# Patient Record
Sex: MALE | Employment: FULL TIME | URBAN - METROPOLITAN AREA
[De-identification: names, ages, dates, MRNs, and addresses within clinical notes are randomized per-mention and may not be internally consistent; named-entity substitution may affect disease eponyms.]

---

## 2024-04-18 ENCOUNTER — APPOINTMENT (OUTPATIENT)
Dept: DERMATOLOGY | Facility: CLINIC | Age: 24
End: 2024-04-18
Payer: COMMERCIAL

## 2024-04-26 ENCOUNTER — OFFICE VISIT (OUTPATIENT)
Dept: DERMATOLOGY | Facility: CLINIC | Age: 24
End: 2024-04-26
Payer: COMMERCIAL

## 2024-04-26 DIAGNOSIS — L40.0 PSORIASIS VULGARIS: Primary | ICD-10-CM

## 2024-04-26 PROCEDURE — 99214 OFFICE O/P EST MOD 30 MIN: CPT | Performed by: DERMATOLOGY

## 2024-04-26 RX ORDER — HYDROCORTISONE 25 MG/G
OINTMENT TOPICAL 2 TIMES DAILY
Qty: 30 G | Refills: 3 | Status: SHIPPED | OUTPATIENT
Start: 2024-04-26 | End: 2025-04-26

## 2024-04-26 RX ORDER — TRIAMCINOLONE ACETONIDE 1 MG/G
OINTMENT TOPICAL
Qty: 30 G | Refills: 11 | Status: SHIPPED | OUTPATIENT
Start: 2024-04-26

## 2024-04-26 NOTE — PROGRESS NOTES
Subjective     Nelson Murdock is a 24 y.o. male who presents for the following: Rash (Redness around belly button - noticed it about 1 year ago. Tried topicals prescribed by PCP ).       Patient reported that this has been going on for a year. He tried topicals prescribed by his PCPs and some OTC which was mildly helpful. His PCP had performed a bacterial swab demonstrating staph growth. Denies any associated pruritus.     Of note, he has a history of psoriasis.     Review of Systems:  No other skin or systemic complaints other than what is documented elsewhere in the note.    The following portions of the chart were reviewed this encounter and updated as appropriate:          Skin Cancer History  No skin cancer on file.      Specialty Problems    None       Objective   Well appearing patient in no apparent distress; mood and affect are within normal limits.    A focused skin examination was performed. All findings within normal limits unless otherwise noted below.    Thick erythematous plaques with white, adherent, micaceous scale.   Physician Global Assessment: 2 - Mild.  Examinations of joints as well as fingers/toes reveals no signs or symptoms of psoriatic arthritis.  < 3% Body Surface Area involved.            Assessment/Plan   Psoriasis vulgaris    Chronic plaque psoriasis- new eruption involving the umbilicus   - Patient has a history of psoriasis on the scalp/face (favoring more sebopsoriasis) which he uses treatment prescribed by his PCP and is well controlled.  - Initially unsure if this was infected site but discussed with patient that this is consistent with Psoriasis and the umbilicus is a common affected area.   - Recommend starting triamcinolone 0.1% ointment to the affected twice a day. Risks of topical corticosteriods reviewed including skin thinning, easy bruising, discoloration with prolonged use.    - START hydrocortisone 2.5% ointment twice a day on the face. Use only as needed.   -RTC 3  month        Related Procedures  Follow Up In Dermatology - Established Patient    Related Medications  triamcinolone (Kenalog) 0.1 % ointment  Apply twice a day to the affected area until resolution.    hydrocortisone 2.5 % ointment  Apply topically 2 times a day.      My DO Emmie  Department of Dermatology    I saw and evaluated the patient. I personally obtained the key and critical portions of the history and physical exam or was physically present for key and critical portions performed by the resident/fellow. I reviewed the resident/fellow's documentation and discussed the patient with the resident/fellow. I agree with the resident/fellow's medical decision making as documented in the note.    Trevor Trent MD PhD

## 2024-08-09 ENCOUNTER — APPOINTMENT (OUTPATIENT)
Dept: DERMATOLOGY | Facility: CLINIC | Age: 24
End: 2024-08-09
Payer: COMMERCIAL

## 2024-11-08 ENCOUNTER — APPOINTMENT (OUTPATIENT)
Dept: DERMATOLOGY | Facility: CLINIC | Age: 24
End: 2024-11-08
Payer: COMMERCIAL

## 2024-11-08 DIAGNOSIS — L40.0 PSORIASIS VULGARIS: ICD-10-CM

## 2024-11-08 PROCEDURE — G2211 COMPLEX E/M VISIT ADD ON: HCPCS | Performed by: DERMATOLOGY

## 2024-11-08 PROCEDURE — 99213 OFFICE O/P EST LOW 20 MIN: CPT | Performed by: DERMATOLOGY

## 2024-11-08 PROCEDURE — 11900 INJECT SKIN LESIONS </W 7: CPT

## 2024-11-08 RX ORDER — DEXTROAMPHETAMINE SACCHARATE, AMPHETAMINE ASPARTATE, DEXTROAMPHETAMINE SULFATE AND AMPHETAMINE SULFATE 5; 5; 5; 5 MG/1; MG/1; MG/1; MG/1
20 TABLET ORAL
COMMUNITY
Start: 2024-11-12 | End: 2025-01-11

## 2024-11-08 ASSESSMENT — ITCH NUMERIC RATING SCALE: HOW SEVERE IS YOUR ITCHING?: 0

## 2024-11-08 NOTE — PROGRESS NOTES
Subjective     Nelson Murdock is a 24 y.o. male who presents for the following: Psoriasis (Triamcinolone somewhat helps but doesn't fully clear it up. Uses Hydrocortisone prn ). [facility] 4/25/2024.    Patient complains of psoriasis affecting his umbilicus, face, and scalp for several years. Face and scalp have responded relatively well to clobetasol 0.05% solution and hydrocortisone 2.5% ointment, but he continues to have some scaling. Additionally, plaque on the umbilicus has been persistent. At last visit, he was started on triamcinolone 0.1% ointment BID to the umbilicus. He reports that while it helps slightly, even if he uses it 2 times a day for 2 weeks in a row, occluding the area, the plaque still remains and worsens when he stops topicals. He has never been completely clear. Denies joint pain, swelling, erythema, or stiffness.    Review of Systems:  No other skin or systemic complaints other than what is documented elsewhere in the note.    The following portions of the chart were reviewed this encounter and updated as appropriate:          Skin Cancer History  No skin cancer on file.      Specialty Problems    None       Objective   Well appearing patient in no apparent distress; mood and affect are within normal limits.    A focused skin examination was performed. All findings within normal limits unless otherwise noted below.    Assessment/Plan   1. Psoriasis vulgaris  Thick erythematous plaques with white, adherent, micaceous scale involving the umbilicus and the scalp.  Physician Global Assessment: 2 - Mild.  Examinations of joints as well as fingers/toes reveals no signs or symptoms of psoriatic arthritis.  < 3% Body Surface Area involved.     Chronic plaque psoriasis, involving scalp/face umbilicus, uncontrolled  - No evidence of psoriatic arthritis  - Discussed management options including continuing current therapy and trialing ILK. Discussed the R/B/A of ILK including hypopigmentation and skin atrophy.  Patient would like to proceed with ILK today for persistent plaque to the umbilicus.  - CONTINUE triamcinolone 0.1% ointment to the affected twice a day. Risks of topical corticosteriods reviewed including skin thinning, easy bruising, discoloration with prolonged use.    - CONTINUE hydrocortisone 2.5% ointment twice a day on the face. Use only as needed.   - CONTINUE clobetasol 0.05% solution as needed to the scalp.  - RTC 2-3 months        Intralesional injection  Intralesional Injection:   Consent:     Consent obtained:  Verbal    Consent given by:  Patient    Risks discussed:  Poor cosmetic result, pain, infection and bleeding    Alternatives discussed:  No treatment  Pre-Procedure Details:     Prep Type:  Isopropyl alcohol  Procedure Details:   Injection:  Triamcinolone  Outcome: patient tolerated procedure well  Post-procedure details: sterile dressing applied and wound care instructions given  Dressing type: bandage   Comments:  A total of 1 ml of 5 mg/mL Kenalog were injected into 1 lesion(s) of the umbilicus  Lot #: [id number]  Expiration: 4/2026    triamcinolone acetonide (Kenalog) injection 5 mg      Related Procedures  Follow Up In Dermatology - Established Patient    Related Medications  triamcinolone (Kenalog) 0.1 % ointment  Apply twice a day to the affected area until resolution.    hydrocortisone 2.5 % ointment  Apply topically 2 times a day.    Alayna Bennett MD  PGY-2, Dermatology     I saw and evaluated the patient, participating in the key elements of the service.  I discussed the findings, assessment and plan with the resident and agree with resident’s findings and plan as documented in the resident's note.  I was immediately available for the entirety of the procedure(s) and present for the key and critical portions.     Trevor Trent MD PhD

## 2025-03-31 ASSESSMENT — DERMATOLOGY QUALITY OF LIFE (QOL) ASSESSMENT
RATE HOW EMOTIONALLY BOTHERED YOU ARE BY YOUR SKIN PROBLEM (FOR EXAMPLE, WORRY, EMBARRASSMENT, FRUSTRATION): 6 - ALWAYS BOTHERED
RATE HOW BOTHERED YOU ARE BY EFFECTS OF YOUR SKIN PROBLEMS ON YOUR ACTIVITIES (EG, GOING OUT, ACCOMPLISHING WHAT YOU WANT, WORK ACTIVITIES OR YOUR RELATIONSHIPS WITH OTHERS): 0 - NEVER BOTHERED
RATE HOW BOTHERED YOU ARE BY SYMPTOMS OF YOUR SKIN PROBLEM (EG, ITCHING, STINGING BURNING, HURTING OR SKIN IRRITATION): 0 - NEVER BOTHERED
RATE HOW BOTHERED YOU ARE BY EFFECTS OF YOUR SKIN PROBLEMS ON YOUR ACTIVITIES (EG, GOING OUT, ACCOMPLISHING WHAT YOU WANT, WORK ACTIVITIES OR YOUR RELATIONSHIPS WITH OTHERS): 0 - NEVER BOTHERED
RATE HOW BOTHERED YOU ARE BY SYMPTOMS OF YOUR SKIN PROBLEM (EG, ITCHING, STINGING BURNING, HURTING OR SKIN IRRITATION): 0 - NEVER BOTHERED
WHAT SINGLE SKIN CONDITION LISTED BELOW IS THE PATIENT ANSWERING THE QUALITY-OF-LIFE ASSESSMENT QUESTIONS ABOUT: PSORIASIS
WHAT SINGLE SKIN CONDITION LISTED BELOW IS THE PATIENT ANSWERING THE QUALITY-OF-LIFE ASSESSMENT QUESTIONS ABOUT: PSORIASIS
RATE HOW EMOTIONALLY BOTHERED YOU ARE BY YOUR SKIN PROBLEM (FOR EXAMPLE, WORRY, EMBARRASSMENT, FRUSTRATION): 6 - ALWAYS BOTHERED

## 2025-03-31 ASSESSMENT — PATIENT GLOBAL ASSESSMENT (PGA): WHAT IS THE PGA: PATIENT GLOBAL ASSESSMENT:  3 - MODERATE

## 2025-04-07 ENCOUNTER — APPOINTMENT (OUTPATIENT)
Dept: DERMATOLOGY | Facility: CLINIC | Age: 25
End: 2025-04-07
Payer: COMMERCIAL

## 2025-04-07 DIAGNOSIS — L40.0 PSORIASIS VULGARIS: Primary | ICD-10-CM

## 2025-04-07 PROCEDURE — 11900 INJECT SKIN LESIONS </W 7: CPT | Performed by: STUDENT IN AN ORGANIZED HEALTH CARE EDUCATION/TRAINING PROGRAM

## 2025-04-07 PROCEDURE — 99213 OFFICE O/P EST LOW 20 MIN: CPT | Performed by: DERMATOLOGY

## 2025-04-07 RX ORDER — TRIAMCINOLONE ACETONIDE 1 MG/G
OINTMENT TOPICAL
Qty: 30 G | Refills: 11 | Status: SHIPPED | OUTPATIENT
Start: 2025-04-07

## 2025-04-07 RX ORDER — CLOBETASOL PROPIONATE 0.5 MG/ML
SOLUTION TOPICAL 2 TIMES DAILY
COMMUNITY
End: 2025-04-07 | Stop reason: SDUPTHER

## 2025-04-07 RX ORDER — CLOBETASOL PROPIONATE 0.5 MG/ML
SOLUTION TOPICAL 2 TIMES DAILY
Qty: 50 ML | Refills: 3 | Status: SHIPPED | OUTPATIENT
Start: 2025-04-07

## 2025-04-07 RX ORDER — HYDROCORTISONE 25 MG/G
OINTMENT TOPICAL 2 TIMES DAILY
Qty: 30 G | Refills: 3 | Status: SHIPPED | OUTPATIENT
Start: 2025-04-07 | End: 2026-04-07

## 2025-04-07 ASSESSMENT — ITCH NUMERIC RATING SCALE: HOW SEVERE IS YOUR ITCHING?: 0

## 2025-04-07 NOTE — PROGRESS NOTES
Subjective     Nelson Murdock is a 24 y.o. male who presents for the following: Psoriasis (Triamcinolone ointment - needs refills /Also needs refills of Hydrocortisone and Clobetasol solution ). Psoriasis limited to scalp and umbilicus. Flaring on the umbilicus. Had improvement from previous ILK.    Review of Systems:  No other skin or systemic complaints other than what is documented elsewhere in the note.    The following portions of the chart were reviewed this encounter and updated as appropriate:          Skin Cancer History  No skin cancer on file.      Specialty Problems    None       Objective   Well appearing patient in no apparent distress; mood and affect are within normal limits.    A focused skin examination was performed. All findings within normal limits unless otherwise noted below.    Assessment/Plan   1. Psoriasis vulgaris  Thick erythematous plaques with white, adherent, micaceous scale involving the umbilicus and the scalp.  Physician Global Assessment: 2 - Mild.  Examinations of joints as well as fingers/toes reveals no signs or symptoms of psoriatic arthritis.  < 3% Body Surface Area involved.     Chronic plaque psoriasis, involving scalp/face umbilicus, uncontrolled  - No evidence of psoriatic arthritis  - Discussed management options including continuing current therapy vs biologics, and trialing ILK. Discussed the R/B/A of ILK including hypopigmentation and skin atrophy. Patient had some improvement with ILK 5mg/ml last visit and would like to proceed with ILK today for persistent plaque to the umbilicus. Will increase to ILK 10mg/mL  - CONTINUE triamcinolone 0.1% ointment to the affected twice a day. Risks of topical corticosteriods reviewed including skin thinning, easy bruising, discoloration with prolonged use.    - CONTINUE hydrocortisone 2.5% ointment twice a day on the face. Use only as needed.   - CONTINUE clobetasol 0.05% solution as needed to the scalp. Refills sent  - RTC 2-3 months  for repeat ILK vs consideration of biologics        Intralesional injection  Intralesional Injection:   Consent:     Consent obtained:  Verbal    Consent given by:  Patient    Risks discussed:  Poor cosmetic result, pain, infection and bleeding    Alternatives discussed:  No treatment  Pre-Procedure Details:     Prep Type:  Isopropyl alcohol  Procedure Details:   Injection:  Triamcinolone  Outcome: patient tolerated procedure well  Post-procedure details: sterile dressing applied and wound care instructions given  Dressing type: bandage   Comments:  A total of 0.5 ml of 10 mg/mL Kenalog were injected into 1 lesion(s)      triamcinolone acetonide (Kenalog) injection 10 mg      Related Procedures  Follow Up In Dermatology - Established Patient    Related Medications  triamcinolone (Kenalog) 0.1 % ointment  Apply twice a day to the affected area until resolution.    hydrocortisone 2.5 % ointment  Apply topically 2 times a day.      Wilton Montes MD   PGY4, Department of Dermatology]      I saw and evaluated the patient, participating in the key elements of the service.  I discussed the findings, assessment and plan with the resident and agree with resident’s findings and plan as documented in the resident's note.  I was immediately available for the entirety of the procedure(s) and present for the key and critical portions.     Trevor Trent MD PhD

## 2025-07-18 ENCOUNTER — APPOINTMENT (OUTPATIENT)
Dept: DERMATOLOGY | Facility: CLINIC | Age: 25
End: 2025-07-18
Payer: COMMERCIAL

## 2025-07-18 DIAGNOSIS — L40.0 PSORIASIS VULGARIS: ICD-10-CM

## 2025-07-18 NOTE — Clinical Note
Chronic plaque psoriasis, uncontrolled BSA >10%  - Significantly affects quality of life, not improved with topical steroids and ILK  - No evidence of psoriatic arthritis  - Received ILK the last two visits and had some improvement, therefore will repeat ILK with Kenalog-10 today to umbilicus plaque that is the most bothersome to the patient  - Patient has failed topical treatments (hydrocortisone ointment, triamcinolone ointment, clobetasol solution). Discussed additional treatment options including continuing ILK injections only although this is not standard and not always effective vs starting a biologic therapy. Patient is agreeable to start systemic therapy due to significant impact on quality of life.  - Start bimekizumab 320 mg, rx sent to  specialty pharmacy  - Biologic start labs ordered (CBCd, CMP, hepatitis B ag/ab, hepatitis C ab, HIV, T-spot)  - RT March 2026

## 2025-07-18 NOTE — PROGRESS NOTES
Subjective     Nelson Murdock is a 25 y.o. male who presents for the following: Psoriasis (ILK injections , using prescribed topicals but doesn't help ). Stubborn plaque on the umbilicus is the most bothersome. Umbilicus plaque has improved with ILK injections. Also using hydrocortisone ointment that does not help.          Review of Systems:  No other skin or systemic complaints other than what is documented elsewhere in the note.    The following portions of the chart were reviewed this encounter and updated as appropriate:          Skin Cancer History  Biopsy Log Book  No skin cancers from Specimen Tracking.    Additional History      Specialty Problems    None       Objective   Well appearing patient in no apparent distress; mood and affect are within normal limits.    A focused skin examination was performed. All findings within normal limits unless otherwise noted below.    Assessment/Plan   Skin Exam  1. PSORIASIS VULGARIS  Generalized  Thick erythematous plaques with white, adherent, micaceous scale; BSA >10%  Chronic plaque psoriasis, uncontrolled BSA >10%  - Significantly affects quality of life, not improved with topical steroids and ILK  - No evidence of psoriatic arthritis  - Received ILK the last two visits and had some improvement, therefore will repeat ILK with Kenalog-10 today to umbilicus plaque that is the most bothersome to the patient  - Patient has failed topical treatments (hydrocortisone ointment, triamcinolone ointment, clobetasol solution). Discussed additional treatment options including continuing ILK injections only although this is not standard and not always effective vs starting a biologic therapy. Patient is agreeable to start systemic therapy due to significant impact on quality of life.  - Start bimekizumab 320 mg, rx sent to  specialty pharmacy  - Biologic start labs ordered (CBCd, CMP, hepatitis B ag/ab, hepatitis C ab, HIV, T-spot)  - [facility] March 2026  - Intralesional  injection  Intralesional Injection:   Consent:     Consent obtained:  Verbal    Consent given by:  Patient    Risks discussed:  Poor cosmetic result, pain, infection and bleeding    Alternatives discussed:  No treatment  Pre-Procedure Details:     Prep Type:  Isopropyl alcohol  Procedure Details:   Injection:  Triamcinolone  Outcome: patient tolerated procedure well  Post-procedure details: sterile dressing applied and wound care instructions given  Dressing type: bandage   Comments:  A total of 0.3 mL of triamcinolone-10 into 1 lesion today  Lot: [id number]  Exp: 9/2027    This Visit  - Follow Up In Dermatology - Established Patient  - CBC and Auto Differential  - Comprehensive Metabolic Panel  - Hepatitis B Surface Antibody  - Hepatitis B Surface Antigen  - Hepatitis C Antibody  - HIV 1/2 Antigen/Antibody Screen with Reflex to Confirmation  - T-Spot TB  - Follow Up In Dermatology - Established Patient  - bimekizumab-bkzx 320 mg/2 mL auto-injector - Inject 2 mL under the skin every 8 (eight) weeks. Starting with dose 6  - bimekizumab-bkzx 320 mg/2 mL auto-injector - Inject 2 mL under the skin every 28 (twenty-eight) days for 5 doses.  - triamcinolone acetonide (Kenalog) injection 10 mg  Existing Treatments  - triamcinolone (Kenalog) 0.1 % ointment - Apply twice a day to the affected area until resolution.  - hydrocortisone 2.5 % ointment - Apply topically 2 times a day.  - clobetasol (Temovate) 0.05 % external solution - Apply topically 2 times a day. to affected area on scalp    Emeli Rees MD  PGY4, Dermatology    I saw and evaluated the patient, participating in the key elements of the service.  I discussed the findings, assessment and plan with the resident and agree with resident’s findings and plan as documented in the resident's note.  I was immediately available for the entirety of the procedure(s) and present for the key and critical portions.     Trevor Trent MD PhD

## 2025-07-21 ENCOUNTER — SPECIALTY PHARMACY (OUTPATIENT)
Dept: PHARMACY | Facility: CLINIC | Age: 25
End: 2025-07-21